# Patient Record
(demographics unavailable — no encounter records)

---

## 2025-05-20 NOTE — IMAGING
[de-identified] : LSPINE Palpation: No tenderness to palpation or spasm in bilateral thoracic and lumbar paraspinal musculature, no SI joint tenderness to palpation ROM: Full with no pain Strength: 5/5 bilateral hip flexors, knee extensors, ankle dorsiflexors, EHL, ankle plantarflexors Sensation: Sensation present to light touch bilateral L2-S1 distributions Provocative maneuvers: Negative bilateral straight leg raise  CSPINE Inspection: No rash or ecchymosis Palpation: No spasm in traps, rhomboids, paracervicals ROM: Full with no pain Strength: 5/5 bilateral deltoid, biceps, triceps, wrist flexors, wrist extensors, , abductors Sensation: Sensation present to light touch bilateral C5-T1 distributions Reflexes: + Right -Left anguiano

## 2025-05-20 NOTE — HISTORY OF PRESENT ILLNESS
[Constant] : constant [Full time] : Work status: full time [de-identified] :  XR LHR: Mild lumbar dextroscoliosis. Normal lumbar lordosis is maintained. No spondylolisthesis. No visualized spondylolysis. No compression deformity. Disc space height is maintained. No endplate spondylosis. Grossly unremarkable soft tissues.  4/16/2025 C& Lumbar MRI  - report noted in chart.  Ind. review- No HNPs ========================================================================================== 04/04/2025: MANAN CARMONA is a 30-year-old female here with lower back pain with N/T down BT arms and legs going on for a few years, no initial injury. Tries water therapy with temporary relief. Did PT for lower back with some relief 8/24. Has no neck pain. Reports low back pain. No medications for pain. Sees Neurologist and was put on nortriptyline. Has not had MRI.  Had EMG showing cervical radiculopathy, unsure of other results.  HX of thyroidectomy, was told it could be a cause for N/T in arms and legs. 5/20/25- MRI f/u   [] : no [de-identified] :

## 2025-05-20 NOTE — ASSESSMENT
[FreeTextEntry1] : 31 y/o F with low back pain and UE and LE numbness No HNPs F/u Neurology, has an appt in July New rx given for PT and meloxicam 15 mg Discussed indications for surgery   NSAIDs- Patient warned of risk of medication to GI tract, increased blood pressure, cardiac risk, and risk of fluid retention.  Advised to clear medication with internist or PCP if any concurrent health problem with heart, blood pressure, or GI system exists.

## 2025-07-01 NOTE — HISTORY OF PRESENT ILLNESS
[Constant] : constant [Full time] : Work status: full time [de-identified] :  XR LHR: Mild lumbar dextroscoliosis. Normal lumbar lordosis is maintained. No spondylolisthesis. No visualized spondylolysis. No compression deformity. Disc space height is maintained. No endplate spondylosis. Grossly unremarkable soft tissues.  4/16/2025 C& Lumbar MRI  - report noted in chart.  Ind. review- No HNPs ========================================================================================== 04/04/2025: MANAN CARMONA is a 30-year-old female here with lower back pain with N/T down BT arms and legs going on for a few years, no initial injury. Tries water therapy with temporary relief. Did PT for lower back with some relief 8/24. Has no neck pain. Reports low back pain. No medications for pain. Sees Neurologist and was put on nortriptyline. Has not had MRI.  Had EMG showing cervical radiculopathy, unsure of other results.  HX of thyroidectomy, was told it could be a cause for N/T in arms and legs. 5/20/25- MRI f/u 7/1/25- sxs same, attending PT   [] : no [de-identified] :

## 2025-07-01 NOTE — ASSESSMENT
[FreeTextEntry1] : 31 y/o F with low back pain and UE and LE numbness No HNPs F/u Neurology, has an appt in July Rheum c/s, is set to see rheum end of July, will discuss getting more bloodwork C/w PT and meds, new rx given f/u 2 months  NSAIDs- Patient warned of risk of medication to GI tract, increased blood pressure, cardiac risk, and risk of fluid retention.  Advised to clear medication with internist or PCP if any concurrent health problem with heart, blood pressure, or GI system exists.

## 2025-07-01 NOTE — IMAGING
[de-identified] : LSPINE Palpation: No tenderness to palpation or spasm in bilateral thoracic and lumbar paraspinal musculature, no SI joint tenderness to palpation ROM: Full with stiffness Strength: 5/5 bilateral hip flexors, knee extensors, ankle dorsiflexors, EHL, ankle plantarflexors Sensation: Sensation present to light touch bilateral L2-S1 distributions Provocative maneuvers: Negative bilateral straight leg raise  CSPINE Palpation: No spasm in traps, rhomboids, paracervicals ROM: Full with stiffness Strength: 5/5 bilateral deltoid, biceps, triceps, wrist flexors, wrist extensors, , abductors Sensation: Sensation present to light touch bilateral C5-T1 distributions Reflexes: + Right -Left anguiano